# Patient Record
Sex: FEMALE | URBAN - METROPOLITAN AREA
[De-identification: names, ages, dates, MRNs, and addresses within clinical notes are randomized per-mention and may not be internally consistent; named-entity substitution may affect disease eponyms.]

---

## 2018-12-11 ENCOUNTER — EMERGENCY (EMERGENCY)
Facility: HOSPITAL | Age: 83
LOS: 1 days | Discharge: HOME | End: 2018-12-11
Attending: EMERGENCY MEDICINE | Admitting: EMERGENCY MEDICINE
Payer: MEDICARE

## 2018-12-11 VITALS
HEART RATE: 92 BPM | DIASTOLIC BLOOD PRESSURE: 74 MMHG | TEMPERATURE: 99 F | RESPIRATION RATE: 16 BRPM | OXYGEN SATURATION: 98 % | SYSTOLIC BLOOD PRESSURE: 156 MMHG

## 2018-12-11 VITALS — HEART RATE: 93 BPM | OXYGEN SATURATION: 94 % | RESPIRATION RATE: 18 BRPM | WEIGHT: 203.05 LBS

## 2018-12-11 PROCEDURE — 99282 EMERGENCY DEPT VISIT SF MDM: CPT | Mod: 25

## 2018-12-11 PROCEDURE — 30901 CONTROL OF NOSEBLEED: CPT

## 2018-12-11 PROCEDURE — 99283 EMERGENCY DEPT VISIT LOW MDM: CPT | Mod: 25

## 2018-12-11 PROCEDURE — 30901 CONTROL OF NOSEBLEED: CPT | Mod: LT

## 2018-12-11 NOTE — ED ADULT NURSE NOTE - NSIMPLEMENTINTERV_GEN_ALL_ED
Implemented All Fall with Harm Risk Interventions:  [place] to call system. Call bell, personal items and telephone within reach. Instruct patient to call for assistance. Room bathroom lighting operational. Non-slip footwear when patient is off stretcher. Physically safe environment: no spills, clutter or unnecessary equipment. Stretcher in lowest position, wheels locked, appropriate side rails in place. Provide visual cue, wrist band, yellow gown, etc. Monitor gait and stability. Monitor for mental status changes and reorient to person, place, and time. Review medications for side effects contributing to fall risk. Reinforce activity limits and safety measures with patient and family. Provide visual clues: red socks.

## 2018-12-11 NOTE — ED ADULT NURSE NOTE - OBJECTIVE STATEMENT
Pt is 83y F, came to ED with epistaxis, began yesterday and continued today, pt takes Eliquis, no active bleeding, denies dizziness, advised on plan of care, verbalized understanding.

## 2018-12-11 NOTE — ED PROVIDER NOTE - OBJECTIVE STATEMENT
pt on eliquis bib ems from snf for epistaxis last night and today, no current bleeding. no fever, trauma, pain, n/v.  pmwilliam - maria antonia

## 2019-02-28 ENCOUNTER — RESULT REVIEW (OUTPATIENT)
Age: 84
End: 2019-02-28

## 2019-12-10 ENCOUNTER — FORM ENCOUNTER (OUTPATIENT)
Age: 84
End: 2019-12-10

## 2019-12-11 ENCOUNTER — APPOINTMENT (OUTPATIENT)
Dept: UROLOGY | Facility: CLINIC | Age: 84
End: 2019-12-11
Payer: MEDICARE

## 2019-12-11 ENCOUNTER — OUTPATIENT (OUTPATIENT)
Dept: OUTPATIENT SERVICES | Facility: HOSPITAL | Age: 84
LOS: 1 days | End: 2019-12-11
Payer: MEDICARE

## 2019-12-11 ENCOUNTER — APPOINTMENT (OUTPATIENT)
Dept: CT IMAGING | Facility: IMAGING CENTER | Age: 84
End: 2019-12-11
Payer: MEDICARE

## 2019-12-11 VITALS
RESPIRATION RATE: 17 BRPM | DIASTOLIC BLOOD PRESSURE: 59 MMHG | BODY MASS INDEX: 44.16 KG/M2 | SYSTOLIC BLOOD PRESSURE: 118 MMHG | WEIGHT: 240 LBS | HEIGHT: 62 IN | HEART RATE: 88 BPM | TEMPERATURE: 97.5 F

## 2019-12-11 DIAGNOSIS — N20.0 CALCULUS OF KIDNEY: ICD-10-CM

## 2019-12-11 PROCEDURE — 74176 CT ABD & PELVIS W/O CONTRAST: CPT

## 2019-12-11 PROCEDURE — 74176 CT ABD & PELVIS W/O CONTRAST: CPT | Mod: 26

## 2019-12-11 PROCEDURE — 99203 OFFICE O/P NEW LOW 30 MIN: CPT

## 2019-12-11 NOTE — REVIEW OF SYSTEMS
[Feeling Tired] : feeling tired [Recent Weight Gain (___ Lbs)] : recent [unfilled] ~Ulb weight gain [Shortness Of Breath] : shortness of breath [Constipation] : constipation [Diarrhea] : diarrhea [see HPI] : see HPI [Seen by urologist before (Name)  ___] : Previously seen by a urologist: [unfilled] [Wake up at night to urinate  How many times?  ___] : wakes up to urinate [unfilled] times during the night [Strain or push to urinate] : strain or push to urinate [Slow urine stream] : slow urine stream [Joint Pain] : joint pain [Joint Swelling] : joint swelling [Limb Swelling] : limb swelling [Difficulty Walking] : difficulty walking [Muscle Weakness] : muscle weakness [Feelings Of Weakness] : feelings of weakness [Negative] : Heme/Lymph

## 2019-12-11 NOTE — ASSESSMENT
[FreeTextEntry1] : Creatinine was 1.09 and BUN was 47 on 12/04/19\par Creatinine was 1.90 and BUN was 78 on 12/09/19\par CT renal stone hunt today revealed no calcification. Creatinine was likely elevated due to pt having only 1 kidney.\par Pt was advised to increase fluid intake.

## 2019-12-11 NOTE — PHYSICAL EXAM
[General Appearance - Well Developed] : well developed [General Appearance - Well Nourished] : well nourished [Well Groomed] : well groomed [General Appearance - In No Acute Distress] : no acute distress [Normal Appearance] : normal appearance [Respiration, Rhythm And Depth] : normal respiratory rhythm and effort [Edema] : no peripheral edema [Exaggerated Use Of Accessory Muscles For Inspiration] : no accessory muscle use [Abdomen Tenderness] : non-tender [Abdomen Soft] : soft [Urinary Bladder Findings] : the bladder was normal on palpation [Costovertebral Angle Tenderness] : no ~M costovertebral angle tenderness [Normal Station and Gait] : the gait and station were normal for the patient's age [] : no rash [Oriented To Time, Place, And Person] : oriented to person, place, and time [No Focal Deficits] : no focal deficits [Affect] : the affect was normal [Mood] : the mood was normal [No Palpable Adenopathy] : no palpable adenopathy [Not Anxious] : not anxious

## 2019-12-11 NOTE — HISTORY OF PRESENT ILLNESS
[FreeTextEntry1] : 84 year old female presents for stones\par Pt reports no pain, but she has an occasional hot sensation in her back. 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 \par \par Creatinine was 1.09 and BUN was 47 on 12/04/19\par Creatinine was 1.90 and BUN was 78 on 12/09/19\par CT renal stone hunt today revealed no calcification. Creatinine was likely elevated due to pt having only 1 kidney.\par Pt was advised to increase fluid intake. \par

## 2019-12-11 NOTE — PHYSICAL EXAM
[General Appearance - Well Developed] : well developed [General Appearance - Well Nourished] : well nourished [Normal Appearance] : normal appearance [Well Groomed] : well groomed [General Appearance - In No Acute Distress] : no acute distress [Edema] : no peripheral edema [Respiration, Rhythm And Depth] : normal respiratory rhythm and effort [Abdomen Soft] : soft [Abdomen Tenderness] : non-tender [Exaggerated Use Of Accessory Muscles For Inspiration] : no accessory muscle use [Urinary Bladder Findings] : the bladder was normal on palpation [Costovertebral Angle Tenderness] : no ~M costovertebral angle tenderness [Normal Station and Gait] : the gait and station were normal for the patient's age [Oriented To Time, Place, And Person] : oriented to person, place, and time [No Focal Deficits] : no focal deficits [] : no rash [Mood] : the mood was normal [Affect] : the affect was normal [Not Anxious] : not anxious [No Palpable Adenopathy] : no palpable adenopathy

## 2019-12-11 NOTE — ADDENDUM
[FreeTextEntry1] :  I, Ju Davis, acted solely as a scribe for Dr. Tyler Hendrickson. The documentation recorded by the scribe accurately reflects the service I personally performed and the decision by me.\par

## 2021-06-03 NOTE — ED PROVIDER NOTE - LOCATION
Today's visit was performed with the assistance of  Services.   Name of : Isaias [id number]   Service used: Video: Third Party      nose